# Patient Record
Sex: MALE | Race: OTHER | Employment: OTHER | URBAN - METROPOLITAN AREA
[De-identification: names, ages, dates, MRNs, and addresses within clinical notes are randomized per-mention and may not be internally consistent; named-entity substitution may affect disease eponyms.]

---

## 2017-01-12 ENCOUNTER — SOCIAL WORK SERVICES (OUTPATIENT)
Dept: HEMATOLOGY/ONCOLOGY | Facility: HOSPITAL | Age: 62
End: 2017-01-12

## 2017-01-12 NOTE — PROGRESS NOTES
LORETO left vm for patient's son, Boom Bermudez who is assisting patient with paperwork. LORETO left  for son to call if he had any questions about financial assistance application that LORETO emailed to son.